# Patient Record
Sex: MALE | Race: WHITE | NOT HISPANIC OR LATINO | URBAN - METROPOLITAN AREA
[De-identification: names, ages, dates, MRNs, and addresses within clinical notes are randomized per-mention and may not be internally consistent; named-entity substitution may affect disease eponyms.]

---

## 2023-05-08 PROBLEM — F15.959 STIMULANT-INDUCED PSYCHOTIC DISORDER: Status: ACTIVE | Noted: 2023-05-08

## 2023-11-14 ENCOUNTER — HOSPITAL ENCOUNTER (OUTPATIENT)
Dept: TELEMEDICINE | Facility: OTHER | Age: 28
Discharge: HOME | End: 2023-11-14
Payer: MEDICAID

## 2023-11-14 PROCEDURE — 99215 PR OFFICE/OUTPT VISIT, EST, LEVL V, 40-54 MIN: ICD-10-PCS | Mod: 95,AF,HB, | Performed by: PSYCHIATRY & NEUROLOGY

## 2023-11-14 PROCEDURE — 99215 OFFICE O/P EST HI 40 MIN: CPT | Mod: 95,AF,HB, | Performed by: PSYCHIATRY & NEUROLOGY

## 2023-11-15 NOTE — CONSULTS
"[facility]  Psychiatry  Telepsychiatry Consult Note    Please see previous notes:    Patient agreeable to consultation via telepsychiatry.    Tele-Consultation from Psychiatry started: 11/14/2023 at 9:35pm  The chief complaint leading to psychiatric consultation is: HI and paranoia  This consultation was requested by , the Emergency Department attending physician.  The location of the consulting psychiatrist is  [place] .  The patient location is [facility] ED [facility]   The patient arrived at the ED at: [facility]    Also present with the patient at the time of the consultation: nobody    Patient Identification:   Cory Ruiz is a 27 y.o. male.    Patient information was obtained from patient and past medical records.  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    On interview, patient reports he was at a treatment center for one day and then sounded as though he got upset and paranoia and anxious. He reports "I did not threaten anyone but I did say violent things in order to prevent anyone from trying to hurt me. I was yelling pretty loudly and getting upset. I was saying ridiculous violent stuff"  Reports he last used methamphetamine 5 days ago.  Reports increased anxiety.  Denied SI and HI  Reports he has been sleeping and eating  This is the extent of patients complaints at this time  12 pt ros was negative aside from sx noted above    Per chart hx and updated where indicated-    PAST PSYCHIATRIC HISTORY:   Diagnosis: amphetamine use disorder, alcohol use disorder   Hospitalizations: many, typically 1-2 weeks   Suicide attempts: 1x from many years ago   Self harm: denies   Trauma: denies   Outpatient provider: none   Medication Trials: " "propranolol, ativan, vistaril, buspar   Neuro hx: none         FAMILY HISTORY:   None      SUBSTANCE USE HISTORY:   Tobacco: none   Alcohol: drinks 1 beer every few days   Complicated w/d: denies   Drugs: meth, does not provide details   Rehab: none, not interested         SOCIAL HISTORY:  Childhood: born and raised in [place]   Family/Support: poor   Marriage: no   Children: 0   Living: in car or at friends  Employment: unemployed  Education: GED   Spirituality: spiritual   Legal: none   Firearms: denies "          Psychiatric Mental Status Exam:  Arousal: alert  Sensorium/Orientation: oriented to grossly intact  Behavior/Cooperation: psychomotor agitation   Speech: loud, pressured  Language: not tested  Mood: " anxious "   Affect: appropriate  Thought Process: circumstantial, tangential, illogical  Thought Content:   Auditory hallucinations: NO  Visual hallucinations: NO  Paranoia: YES:      Delusions:  NO  Suicidal ideation: NO  Homicidal ideation: NO  Attention/Concentration:  impaired  Memory:    Recent:  Intact   Remote: Intact    Fund of Knowledge: Aware of current events   Abstract reasoning: similarities were abstract  Insight: poor awareness of illness  Judgment:  poor      Past Medical History: No past medical history on file.   Laboratory Data: Labs Reviewed - No data to display      Allergies:   Review of patient's allergies indicates:  No Known Allergies    Medications in ER: Medications - No data to display    Medications at home: reviewed with patient and in MAR    No new subjective & objective note has been filed under this hospital service since the last note was generated.      Assessment - Diagnosis - Goals:     Diagnosis/Impression:   Unspecified psychotic disorder  Hx of stimulant induced psychosis  Stimulant use disorder    Rec:   Recommend PEC for risk of harm to others/grave disability. Inpatient psychiatric tx once medically cleared.  Ativan 2mg IV/IM q 2hours prn severe agitation  Will " defer to inpatient psychiatric team to start/modify scheduled medications.    Time with patient, coordinating care: 31min      More than 50% of the time was spent counseling/coordinating care    Consulting clinician was informed of the encounter and consult note.    Consultation ended: 11/14/2023 at 11:00pm    Zain Norton MD  Psychiatry  [facility]